# Patient Record
Sex: FEMALE | Race: BLACK OR AFRICAN AMERICAN | Employment: FULL TIME | URBAN - METROPOLITAN AREA
[De-identification: names, ages, dates, MRNs, and addresses within clinical notes are randomized per-mention and may not be internally consistent; named-entity substitution may affect disease eponyms.]

---

## 2021-03-16 ENCOUNTER — VIRTUAL VISIT (OUTPATIENT)
Dept: ENDOCRINOLOGY | Age: 51
End: 2021-03-16
Payer: COMMERCIAL

## 2021-03-16 DIAGNOSIS — L65.9 ALOPECIA: ICD-10-CM

## 2021-03-16 DIAGNOSIS — E05.20 TOXIC MULTINODULAR GOITER: Primary | ICD-10-CM

## 2021-03-16 DIAGNOSIS — E78.1 HYPERTRIGLYCERIDEMIA: ICD-10-CM

## 2021-03-16 PROCEDURE — 99204 OFFICE O/P NEW MOD 45 MIN: CPT | Performed by: INTERNAL MEDICINE

## 2021-03-16 RX ORDER — FLUOXETINE HYDROCHLORIDE 20 MG/1
CAPSULE ORAL
COMMUNITY
Start: 2021-01-25

## 2021-03-16 RX ORDER — OMEGA-3-ACID ETHYL ESTERS 1 G/1
CAPSULE, LIQUID FILLED ORAL
COMMUNITY
Start: 2021-02-15

## 2021-03-16 RX ORDER — ATORVASTATIN CALCIUM 20 MG/1
TABLET, FILM COATED ORAL
COMMUNITY
Start: 2021-02-15

## 2021-03-16 RX ORDER — FENOFIBRATE 145 MG/1
145 TABLET, COATED ORAL DAILY
Qty: 90 TAB | Refills: 2 | Status: SHIPPED | OUTPATIENT
Start: 2021-03-16

## 2021-03-16 RX ORDER — ATENOLOL 25 MG/1
TABLET ORAL
COMMUNITY
Start: 2021-01-29

## 2021-03-16 RX ORDER — TRIAMCINOLONE ACETONIDE 1 MG/G
CREAM TOPICAL
COMMUNITY
Start: 2021-01-29

## 2021-03-16 NOTE — PROGRESS NOTES
Chief Complaint   Patient presents with    New Patient     Doxy: [phone]     Thyroid Problem     Notes are in CC    Other     [facility]  Pharmacy     History of Present Illness: Norma Cifuentes is a 52 y.o. female with a past medical history significant for anxiety and depression with suicidal ideation, nephrolithiasis, Hashimoto's hypothyroidism seen in referral from Dhara Bowman 36 for discussion related to hypertriglyceridemia and thyroid function. PCP works out of [facility]. Is not taking levothyroxine. Was taking biotin- stopped it- was not taking it at the time the labs were drawn. Has had 3 biopsies. Does have a history of nodules- has had 3 ultrasounds- last last one was done at [facility] in [place]. Hair is almost gone. All in the middle. Denies temporal balding or hirsutism, acne. Last CT was 1 year ago. No dysphagia, no dyspnea when lying flat. No hx of pancreatitis. PCP called in 2 meds for her- omega 3 and atorvastatin. Has sore R shoulder on statin. Past Medical History:   Diagnosis Date    Chronic kidney disease     kidney stones    Goiter     Other ill-defined conditions(089.89)     high cholesterol    Psychiatric disorder     anxiety and depression     Past Surgical History:   Procedure Laterality Date    HX WISDOM TEETH EXTRACTION       Current Outpatient Medications   Medication Sig    atenoloL (TENORMIN) 25 mg tablet     atorvastatin (LIPITOR) 20 mg tablet     FLUoxetine (PROzac) 20 mg capsule     omega-3 acid ethyl esters (LOVAZA) 1 gram capsule     triamcinolone acetonide (KENALOG) 0.1 % topical cream      No current facility-administered medications for this visit.       Allergies   Allergen Reactions    Other Medication Itching     SHRIMP     Family History   Problem Relation Age of Onset    Blindness Mother     Kidney Disease Mother     Cancer Father     Lupus Father     Sickle Cell Trait Father     No Known Problems Sister    no known family hx of thyroid dysfunction     Social Hx: smokes 7-8 /day   Drinks beer 20 oz per day    Review of Systems:  - Constitutional Symptoms: no fevers, chills, weight loss  - Eyes: no blurry vision or double vision  - Cardiovascular: no chest pain or palpitations  - Respiratory: no cough or shortness of breath  - Gastrointestinal: no dysphagia or abdominal pain  - Musculoskeletal: no joint pains or weakness  - Integumentary: significant hair loss   - Neurological: no numbness, tingling, or headaches  - Psychiatric: no depression or anxiety  - Endocrine: no heat or cold intolerance, no polyuria or polydipsia    - Physical Examination:  - - GENERAL: NCAT, Appears well nourished   - EYES: EOMI, non-icteric, no proptosis   - Ear/Nose/Throat: NCAT, no visible inflammation or masses   - CARDIOVASCULAR: no cyanosis, no visible JVD   - RESPIRATORY: respiratory effort normal without any distress or labored breathing   - MUSCULOSKELETAL: Normal ROM of neck and upper extremities observed   - SKIN: No rash on face  - NEUROLOGIC:  No facial asymmetry (Cranial nerve 7 motor function), No gaze palsy   - PSYCHIATRIC: Normal affect, Normal insight and judgement     Data Reviewed:         Assessment/Plan: This is a very pleasant 57-year-old female seen in referral from Carrie Piña [place] [id number] for discussion related to a likely toxic multinodular goiter. Will obtain I-123 uptake and scan to definitively diagnose this. Has a history of a multinodular goiter and has undergone multiple biopsies. Despite low TSH, she is biochemically euthyroid otherwise making thyroid dysfunction extremely unlikely in contributing to the hair loss. Will refer to dermatology for possible alopecia. Does not sound like androgenic hair loss given absence of acne/hirsutism. Otherwise, discussed the fact that she has extremely elevated triglyceride levels to the point that she is at risk for pancreatitis.   Encouraged her to decrease, but not discontinue altogether, her consumption of alcohol. Discontinue statin, fibrates are more successful at lowering triglyceride levels than statins are. The LDL cholesterol is falsely elevated on the lipid panel, it is not possible to accurately assess LDL cholesterol levels in the setting of hypertriglyceridemia as severe as hers. Repeat lipid panel in 3 months with hepatic function panel. 1. Toxic multinodular goiter  - NM THYROID IMAGE UPT SNGL/MULTI; Future  - TSH 3RD GENERATION; Future  - T4, FREE; Future  - T3 TOTAL; Future  - THYROID STIMULATING IMMUNOGLOBULIN; Future    2. Hypertriglyceridemia  -stop atorvastatin, start feofibrate   -continue omega  3 fatty acids as you are doing  -try to cut down alcohol but not discontinue completely  - LIPID PANEL; Future 3 months from now  - METABOLIC PANEL, COMPREHENSIVE; Future    3. Alopecia  - REFERRAL TO DERMATOLOGY      Avoid these medications for 7 days prior to your appointment:   Multivitamins (iodine-containing)  · Glucosamine/Chondroitin supplements  · Medications containing red food dyes (consult your prescriber before discontinuing any red-colored medications)   Triiodothyronine   Antithyroid medications  o Methimazole, Carbimazole, Tapazole, Mercazole, Propylthiouracil (PTU)  · Amiodarone (Cordarone, Pacerone)    Avoid these medications for 14 days prior to your appointment:  · Potassium Iodide  · Super-Saturated Potassium Iodide (SSKI)  · Lugol's Solution  · Patients should not have received intravenous iodinated contrast material (CT scan, e.g.) for a minimum of 4 weeks prior to this study and preferably 8 weeks prior    Avoid these medications for 4 to 8 weeks prior to your appointment:   Intravenous (IV) iodinated contrast material (CT scan, e.g.)    Avoid these medications as directed:*  · Thyroid extract  · Synthroid, Levothyroxine, Hardy Thyroid    *Generally these medications are held for 1-2 weeks prior to the study.  Check with your physician for specific instructions. Avoid this foods for 1 week prior to your exam:  · Iodized salt and sea salt  · All seafood (including fish, shellfish, kelp/seaweed, sushi, crabs)  · Foods that contain the additives carrageen, agar-agar, algin, and alginates  · Breads made with iodate dough conditioners  · Molasses  · Soy products (soy sauce, soy milk)  · Slim-Fast or other meal replacement drinks/shakes    Minimize these foods for 1 week prior to your exam:  · Milk and other dairy products (including ice cream, cheese, yogurt, etc.)  · Eggs  · Cured or corned foods (ham, lox, corned beef, sauerkraut)  · Foods containing red food dyes  · Chocolate    Additional notes/guidelines:  · Avoid restaurant foods since there is no reasonable way to determine which restaurants use iodized salts  · Foods that contain small amounts of milk or eggs may be used  Non-iodized salt may be used as desired. This diet does not limit sodium intake in foods. Copy sent to: LISBETH George    Return to care: June 16th at [facility] is a 52 y.o. female being evaluated by a Virtual Visit (video visit) encounter to address concerns as mentioned above. A caregiver was present when appropriate. Due to this being a TeleHealth encounter (During REQCN-94 public health emergency), evaluation of the following organ systems was limited:     Vitals/Constitutional/EENT/Resp/CV/GI//MS/Neuro/Skin/Heme-Lymph-Imm. Pursuant to the emergency declaration under the [address] [place] [address] authority and the [employer] and [employer] Act, this Virtual Visit was conducted with patient's (and/or legal guardian's) consent, to reduce the risk of exposure to COVID-19 and provide necessary medical care. Services were provided through a video synchronous discussion virtually to substitute for in-person encounter.     --Chema Mcmanus MD on 3/16/2021 at 10:44 AM    An electronic signature was used to authenticate this note.

## 2021-06-16 ENCOUNTER — VIRTUAL VISIT (OUTPATIENT)
Dept: ENDOCRINOLOGY | Age: 51
End: 2021-06-16

## 2021-06-16 ENCOUNTER — TELEPHONE (OUTPATIENT)
Dept: ENDOCRINOLOGY | Age: 51
End: 2021-06-16

## 2021-06-16 NOTE — PROGRESS NOTES
Pt was called twice for her virtual carlton but I was unable to reach on both of her contact number. I  Then tried to leave voice message on both lines, but her mailbox was full on her mobil number, therefore, messages were only left on her home number.